# Patient Record
Sex: FEMALE | Race: OTHER | Employment: UNEMPLOYED | URBAN - METROPOLITAN AREA
[De-identification: names, ages, dates, MRNs, and addresses within clinical notes are randomized per-mention and may not be internally consistent; named-entity substitution may affect disease eponyms.]

---

## 2018-07-13 ENCOUNTER — OFFICE VISIT (OUTPATIENT)
Dept: NEUROLOGY | Age: 68
End: 2018-07-13

## 2018-07-13 VITALS
RESPIRATION RATE: 18 BRPM | BODY MASS INDEX: 26.04 KG/M2 | SYSTOLIC BLOOD PRESSURE: 128 MMHG | HEIGHT: 62 IN | WEIGHT: 141.5 LBS | DIASTOLIC BLOOD PRESSURE: 74 MMHG | OXYGEN SATURATION: 92 % | HEART RATE: 67 BPM

## 2018-07-13 DIAGNOSIS — G25.0 ESSENTIAL TREMOR: Primary | ICD-10-CM

## 2018-07-13 RX ORDER — PROPRANOLOL HYDROCHLORIDE 60 MG/1
60 CAPSULE, EXTENDED RELEASE ORAL DAILY
Qty: 30 CAP | Refills: 6 | Status: SHIPPED | OUTPATIENT
Start: 2018-07-13 | End: 2019-04-14

## 2018-07-13 NOTE — PROGRESS NOTES
Essential tremor. Neurology Consult      Subjective: Bernice Oden is a 74 y.o. female who comes in today alone. Says she currently lives with her daughter. She says she has had tremors for the last year involving the whole body. Family history negative for the same as she knows it. Knows of nothing that makes it worse or better. Says she notices the tremor is most problematic when she is writing or using her hands in a manner of finesse. There is been no falls. Says her cognition is been good and transfers okay. No psychosis in the picture is I know it. Unfortunately we do not have a validated medication list that needs to be produced so I can proceed with the treatment of what appears to be an essential tremor. Current Outpatient Prescriptions   Medication Sig Dispense Refill    cholecalciferol, vitamin D3, (VITAMIN D3 PO) Take  by mouth.  OTHER \"blue memory pill\"      divalproex DR (DEPAKOTE) 500 mg tablet Take 500 mg by mouth two (2) times a day.  clonazePAM (KLONOPIN) 0.5 mg tablet Take 0.5 mg by mouth two (2) times daily as needed.  propranolol (INDERAL) 10 mg tablet Take 10 mg by mouth two (2) times a day. No Known Allergies  Past Medical History:   Diagnosis Date    Bipolar affective ([facility] [address]     Psychiatric disorder     bipolar      Past Surgical History:   Procedure Laterality Date    HX CHOLECYSTECTOMY  1    HX TONSILLECTOMY        Social History     Social History    Marital status:      Spouse name: N/A    Number of children: N/A    Years of education: N/A     Occupational History    Not on file. Social History Main Topics    Smoking status: Current Every Day Smoker     Packs/day: 1.00     Years: 50.00    Smokeless tobacco: Never Used    Alcohol use No    Drug use: No    Sexual activity: Not on file     Other Topics Concern    Not on file     Social History Narrative      No family history on file.    Visit Vitals    /74    Pulse 67    Resp 18    Ht 5' 2\" (1.575 m)    Wt 64.2 kg (141 lb 8 oz)    SpO2 92%    BMI 25.88 kg/m2        Review of Systems:   A comprehensive review of systems was negative except for that written in the HPI. Neuro Exam:     Appearance: The patient is well developed, well nourished, provides a partial coherent history and is in no acute distress. Mental Status: Oriented to time, place and person. Mood and affect appropriate. Cranial Nerves:   Intact visual fields. Fundi are benign. CUAUHTEMOC, EOM's full, no nystagmus, no ptosis. Facial sensation is normal. Corneal reflexes are intact. Facial movement is symmetric. Hearing is normal bilaterally. Palate is midline with normal sternocleidomastoid and trapezius muscles are normal. Tongue is midline. Motor:  5/5 strength in upper and lower proximal and distal muscles. Normal bulk and tone. No fasciculations. Reflexes:   Deep tendon reflexes 1-2+/4 and symmetrical.   Sensory:   Normal to touch, pinprick and vibration. Gait:  Normal gait but somewhat cautious and deliberate. Tremor:    tremor noted of the head and the upper extremities with a postural kinetic and intention quality. No confident rigidity or bradykinesia and transfers were taken reasonably. .   Cerebellar:  No cerebellar signs present. Neurovascular:  Normal heart sounds and regular rhythm, peripheral pulses intact, and no carotid bruits. Archimedes spiral shows superimposed variations on her concentric circles and deviations in her cursive writing. Assessment:   Essential tremor. I need the patient's actual medication list to make sense of which drug I will challenge her tremor with. Would like to initially consider a beta-blocker like inderal but good default to mysoline Topamax or gabapentin. Patient urged to stop smoking. Patient warned that I have medicine that can help control the tremor, but nothing is going to cure it or anything similar.         Plan:   Revisit 2 months.   Signed by :  Rachel Masterson MD

## 2018-07-13 NOTE — PROGRESS NOTES
Patient's medication list finally surfaced. Looks like she is on Depakote which could enhance an essential type tremor for what it is worth. Will prescribe inderal LA 60 mg a day as an essential tremor suppressant. tamika

## 2018-07-13 NOTE — MR AVS SNAPSHOT
[address] [address] [place] [facility] [address] [phone] Patient: Geovanni Peterson MRN: [id number] Visit Information Date & Time Provider Department Dept. Phone Encounter #  
 7/13/2018  9:00 AM Henny Pat MD [facility] Neurology [facility] [phone] [id number] Follow-up Instructions Return in about 2 months (around 9/13/2018). Follow-up and Disposition History Upcoming Health Maintenance Date Due Hepatitis C Screening 1950 DTaP/Tdap/Td series (1 - Tdap) 8/27/1973 BREAST CANCER SCRN MAMMOGRAM 8/27/2002 FOBT Q 1 YEAR AGE 50-75 8/27/2002 ZOSTER VACCINE AGE 60> 6/27/2012 GLAUCOMA SCREENING Q2Y 8/27/2017 Bone Densitometry (Dexa) Screening 8/27/2017 Pneumococcal 65+ Low/Medium Risk (1 of 2 - PCV13) 8/27/2017 Influenza Age 5 to Adult 8/1/2018 Allergies as of 7/13/2018  Review Complete On: 7/13/2018 By: Henny Pat MD  
 No Known Allergies Current Immunizations  Never Reviewed No immunizations on file. Not reviewed this visit You Were Diagnosed With   
  
 Codes Comments Essential tremor    -  Primary ICD-10-CM: G25.0 ICD-9-CM: 333.1 Vitals BP Pulse Resp Height(growth percentile) Weight(growth percentile) SpO2  
 128/74 67 18 5' 2\" (1.575 m) 141 lb 8 oz (64.2 kg) 92% BMI OB Status Smoking Status 25.88 kg/m2 Postmenopausal Current Every Day Smoker Vitals History BMI and BSA Data Body Mass Index Body Surface Area  
 25.88 kg/m 2 1.68 m 2 Your Updated Medication List  
  
   
This list is accurate as of 7/13/18 10:07 AM.  Always use your most recent med list.  
  
  
  
  
 clonazePAM 0.5 mg tablet Commonly known as:  Britton Angeles Take 0.5 mg by mouth two (2) times daily as needed. divalproex  mg tablet Commonly known as:  DEPAKOTE  
 Take 500 mg by mouth two (2) times a day. OTHER \"blue memory pill\" * propranolol 10 mg tablet Commonly known as:  INDERAL Take 10 mg by mouth two (2) times a day. * propranolol LA 60 mg SR capsule Commonly known as:  INDERAL LA Take 1 Cap by mouth daily. Indications: ESSENTIAL TREMOR  
  
 VITAMIN D3 PO Take  by mouth. * Notice: This list has 2 medication(s) that are the same as other medications prescribed for you. Read the directions carefully, and ask your doctor or other care provider to review them with you. Prescriptions Printed Refills  
 propranolol LA (INDERAL LA) 60 mg SR capsule 6 Sig: Take 1 Cap by mouth daily. Indications: ESSENTIAL TREMOR Class: Print Route: Oral  
  
Follow-up Instructions Return in about 2 months (around 9/13/2018). Patient Instructions Information Regarding Testing and Medication If you have physican order for a test or a medication denied by your insurance company, this does not mean the test or medication is not appropriate for you as that is a medical decision, not a decision to be made by an insurance company representative or by an [facility] physician who has not interviewed and examined you. This is a decision to be made between you and your physician. The denial of services is a contractual matter between you and your insurance company, not an issue between your physician and the insurance company. If your test or medication is denied, you can take the following steps to help resolve the issue: 1. File a complaint with the [facility] of Insurance regarding your insurance company's denial of services ordered for you. You can do this either by calling them directly or by completing an on-line complaint form on the [employer]. This can be found at www.[place].gov 2.    Also file a formal complaint with your insurance company and ask to have the name of the person denying the service so that you may explore a legal option should you be harmed by this denial of service. Again, the fact the insurance company will not pay for the service does not mean it is not medically necessary and I would encourage you to follow through with the plan that was made with your physician 3. File a written complaint with your employer so your employer and benefit manager is aware of the poor coverage they are providing their employees. If you have medicare/medicaid, complain to your representative in the House and to your Amilcar Huerta. If we have ordered testing for you, we do not call patients with results and we do not give test results over the phone. We schedule follow up appointments so that your results can be discussed in person and any questions you have regarding them may be addressed. If something of concern is revealed on your test, we will call you for a sooner follow up appointment. Additionally, results may be found by using the My Chart feature and one of our patient service representatives at the  can give you instructions on how to access this feature of our electronic medical record system. [address] What is a living will? A living will is a legal form you use to write down the kind of care you want at the end of your life. It is used by the health professionals who will treat you if you aren't able to decide for yourself. If you put your wishes in writing, your loved ones and others will know what kind of care you want. They won't need to guess. This can ease your mind and be helpful to others. A living will is not the same as an estate or property will. An estate will explains what you want to happen with your money and property after you die. Is a living will a legal document? A living will is a legal document.  Each state has its own laws about living parra. If you move to another state, make sure that your living will is legal in the state where you now live. Or you might use a universal form that has been approved by many states. This kind of form can sometimes be completed and stored online. Your electronic copy will then be available wherever you have a connection to the Internet. In most cases, doctors will respect your wishes even if you have a form from a different state. · You don't need an  to complete a living will. But legal advice can be helpful if your state's laws are unclear, your health history is complicated, or your family can't agree on what should be in your living will. · You can change your living will at any time. Some people find that their wishes about end-of-life care change as their health changes. · In addition to making a living will, think about completing a medical power of  form. This form lets you name the person you want to make end-of-life treatment decisions for you (your \"health care agent\") if you're not able to. Many hospitals and nursing homes will give you the forms you need to complete a living will and a medical power of . · Your living will is used only if you can't make or communicate decisions for yourself anymore. If you become able to make decisions again, you can accept or refuse any treatment, no matter what you wrote in your living will. · Your state may offer an online registry. This is a place where you can store your living will online so the doctors and nurses who need to treat you can find it right away. What should you think about when creating a living will? Talk about your end-of-life wishes with your family members and your doctor. Let them know what you want. That way the people making decisions for you won't be surprised by your choices. Think about these questions as you make your living will: · Do you know enough about life support methods that might be used? If not, talk to your doctor so you know what might be done if you can't breathe on your own, your heart stops, or you're unable to swallow. · What things would you still want to be able to do after you receive life-support methods? Would you want to be able to walk? To speak? To eat on your own? To live without the help of machines? · If you have a choice, where do you want to be cared for? In your home? At a hospital or nursing home? · Do you want certain Holiness practices performed if you become very ill? · If you have a choice at the end of your life, where would you prefer to die? At home? In a hospital or nursing home? Somewhere else? · Would you prefer to be buried or cremated? · Do you want your organs to be donated after you die? What should you do with your living will? · Make sure that your family members and your health care agent have copies of your living will. · Give your doctor a copy of your living will to keep in your medical record. If you have more than one doctor, make sure that each one has a copy. · You may want to put a copy of your living will where it can be easily found. Where can you learn more? Go to http://fransisco-baudilio.info/. Enter K855 in the search box to learn more about \"Learning About Living Frank Pablo. \" Current as of: October 6, 2017 Content Version: 11.7 © 0838-8127 Healthwise, Incorporated. Care instructions adapted under license by [employer] (which disclaims liability or warranty for this information). If you have questions about a medical condition or this instruction, always ask your healthcare professional. [address] [address] any warranty or liability for your use of this information. Patient history reviewed and patient examined. Appears to have attributes of essential tremor.   I need the patient's medication list to make an accurate and appropriate assessment of a drug challenge to make this happen. Will suggest revisit in about 2 months. The quicker I get the medication list, the quicker I can put this medicine into play. Patient Instructions History Introducing [facility]! Sarath Benitez introduces Strat[employer]ale patient portal. Now you can access parts of your medical record, email your doctor's office, and request medication refills online. 1. In your internet browser, go to [email or web address] 2. Click on the First Time User? Click Here link in the Sign In box. You will see the New Member Sign Up page. 3. Enter your Stratoscale Access Code exactly as it appears below. You will not need to use this code after youve completed the sign-up process. If you do not sign up before the expiration date, you must request a new code. · Stratoscale Access Code: [email or web address] Expires: 10/11/2018  8:24 AM 
 
4. Enter the last four digits of your Social Security Number (xxxx) and Date of Birth (mm/dd/yyyy) as indicated and click Submit. You will be taken to the next sign-up page. 5. Create a Stratoscale ID. This will be your Stratoscale login ID and cannot be changed, so think of one that is secure and easy to remember. 6. Create a Stratoscale password. You can change your password at any time. 7. Enter your Password Reset Question and Answer. This can be used at a later time if you forget your password. 8. Enter your e-mail address. You will receive e-mail notification when new information is available in [address] 9. Click Sign Up. You can now view and download portions of your medical record. 10. Click the Download Summary menu link to download a portable copy of your medical information. If you have questions, please visit the Frequently Asked Questions section of the Stratoscale website. Remember, Stratoscale is NOT to be used for urgent needs. For medical emergencies, dial 911. Now available from your iPhone and Android! Please provide this summary of care documentation to your next provider. Your primary care clinician is listed as Faisal Driscoll. If you have any questions after today's visit, please call [phone].

## 2018-07-13 NOTE — PATIENT INSTRUCTIONS
Information Regarding Testing and Medication    If you have physican order for a test or a medication denied by your insurance company, this does not mean the test or medication is not appropriate for you as that is a medical decision, not a decision to be made by an insurance company representative or by an [facility] physician who has not interviewed and examined you. This is a decision to be made between you and your physician. The denial of services is a contractual matter between you and your insurance company, not an issue between your physician and the insurance company. If your test or medication is denied, you can take the following steps to help resolve the issue:    1. File a complaint with the [facility] regarding your insurance company's denial of services ordered for you. You can do this either by calling them directly or by completing an on-line complaint form on the [employer]. This can be found at www.[place].GaN Systems    2. Also file a formal complaint with your insurance company and ask to have the name of the person denying the service so that you may explore a legal option should you be harmed by this denial of service. Again, the fact the insurance company will not pay for the service does not mean it is not medically necessary and I would encourage you to follow through with the plan that was made with your physician    3. File a written complaint with your employer so your employer and benefit manager is aware of the poor coverage they are providing their employees. If you have medicare/medicaid, complain to your representative in the House and to your Aimlcar Huerta. If we have ordered testing for you, we do not call patients with results and we do not give test results over the phone. We schedule follow up appointments so that your results can be discussed in person and any questions you have regarding them may be addressed. If something of concern is revealed on your test, we will call you for a sooner follow up appointment. Additionally, results may be found by using the My Chart feature and one of our patient service representatives at the  can give you instructions on how to access this feature of our electronic medical record system. Learning About Living [place]  What is a living will? A living will is a legal form you use to write down the kind of care you want at the end of your life. It is used by the health professionals who will treat you if you aren't able to decide for yourself. If you put your wishes in writing, your loved ones and others will know what kind of care you want. They won't need to guess. This can ease your mind and be helpful to others. A living will is not the same as an estate or property will. An estate will explains what you want to happen with your money and property after you die. Is a living will a legal document? A living will is a legal document. Each state has its own laws about living parra. If you move to another state, make sure that your living will is legal in the state where you now live. Or you might use a universal form that has been approved by many states. This kind of form can sometimes be completed and stored online. Your electronic copy will then be available wherever you have a connection to the Internet. In most cases, doctors will respect your wishes even if you have a form from a different state. · You don't need an  to complete a living will. But legal advice can be helpful if your state's laws are unclear, your health history is complicated, or your family can't agree on what should be in your living will. · You can change your living will at any time. Some people find that their wishes about end-of-life care change as their health changes. · In addition to making a living will, think about completing a medical power of  form. This form lets you name the person you want to make end-of-life treatment decisions for you (your \"health care agent\") if you're not able to. Many hospitals and nursing homes will give you the forms you need to complete a living will and a medical power of . · Your living will is used only if you can't make or communicate decisions for yourself anymore. If you become able to make decisions again, you can accept or refuse any treatment, no matter what you wrote in your living will. · Your state may offer an online registry. This is a place where you can store your living will online so the doctors and nurses who need to treat you can find it right away. What should you think about when creating a living will? Talk about your end-of-life wishes with your family members and your doctor. Let them know what you want. That way the people making decisions for you won't be surprised by your choices. Think about these questions as you make your living will:  · Do you know enough about life support methods that might be used? If not, talk to your doctor so you know what might be done if you can't breathe on your own, your heart stops, or you're unable to swallow. · What things would you still want to be able to do after you receive life-support methods? Would you want to be able to walk? To speak? To eat on your own? To live without the help of machines? · If you have a choice, where do you want to be cared for? In your home? At a hospital or nursing home? · Do you want certain Alevism practices performed if you become very ill? · If you have a choice at the end of your life, where would you prefer to die? At home? In a hospital or nursing home? Somewhere else? · Would you prefer to be buried or cremated? · Do you want your organs to be donated after you die? What should you do with your living will? · Make sure that your family members and your health care agent have copies of your living will.   · Give your doctor a copy of your living will to keep in your medical record. If you have more than one doctor, make sure that each one has a copy. · You may want to put a copy of your living will where it can be easily found. Where can you learn more? Go to http://fransisco-baudilio.info/. Enter J209 in the search box to learn more about \"Learning About Living Catia. \"  Current as of: October 6, 2017  Content Version: 11.7  © 7018-0608 [employer]. Care instructions adapted under license by [employer] (which disclaims liability or warranty for this information). If you have questions about a medical condition or this instruction, always ask your healthcare professional. [address] any warranty or liability for your use of this information. Patient history reviewed and patient examined. Appears to have attributes of essential tremor. I need the patient's medication list to make an accurate and appropriate assessment of a drug challenge to make this happen. Will suggest revisit in about 2 months. The quicker I get the medication list, the quicker I can put this medicine into play.

## 2019-04-14 ENCOUNTER — APPOINTMENT (OUTPATIENT)
Dept: CT IMAGING | Age: 69
End: 2019-04-14
Attending: EMERGENCY MEDICINE
Payer: MEDICAID

## 2019-04-14 ENCOUNTER — HOSPITAL ENCOUNTER (EMERGENCY)
Age: 69
Discharge: HOME | End: 2019-04-15
Attending: EMERGENCY MEDICINE | Admitting: EMERGENCY MEDICINE
Payer: MEDICAID

## 2019-04-14 DIAGNOSIS — R19.7 DIARRHEA, UNSPECIFIED TYPE: ICD-10-CM

## 2019-04-14 DIAGNOSIS — K52.9 COLITIS: Primary | ICD-10-CM

## 2019-04-14 LAB
ALBUMIN SERPL-MCNC: 3.1 G/DL (ref 3.5–5)
ALBUMIN/GLOB SERPL: 0.8 {RATIO} (ref 1.1–2.2)
ALP SERPL-CCNC: 48 U/L (ref 45–117)
ALT SERPL-CCNC: 13 U/L (ref 12–78)
ANION GAP SERPL CALC-SCNC: 6 MMOL/L (ref 5–15)
APPEARANCE UR: ABNORMAL
AST SERPL-CCNC: 16 U/L (ref 15–37)
BACTERIA URNS QL MICRO: ABNORMAL /HPF
BASOPHILS # BLD: 0 K/UL (ref 0–0.1)
BASOPHILS NFR BLD: 0 % (ref 0–1)
BILIRUB SERPL-MCNC: 0.3 MG/DL (ref 0.2–1)
BILIRUB UR QL CFM: NEGATIVE
BUN SERPL-MCNC: 12 MG/DL (ref 6–20)
BUN/CREAT SERPL: 15 (ref 12–20)
CALCIUM SERPL-MCNC: 8.5 MG/DL (ref 8.5–10.1)
CHLORIDE SERPL-SCNC: 103 MMOL/L (ref 97–108)
CO2 SERPL-SCNC: 29 MMOL/L (ref 21–32)
COLOR UR: ABNORMAL
CREAT SERPL-MCNC: 0.82 MG/DL (ref 0.55–1.02)
DIFFERENTIAL METHOD BLD: ABNORMAL
EOSINOPHIL # BLD: 0 K/UL (ref 0–0.4)
EOSINOPHIL NFR BLD: 1 % (ref 0–7)
EPITH CASTS URNS QL MICRO: ABNORMAL /LPF
ERYTHROCYTE [DISTWIDTH] IN BLOOD BY AUTOMATED COUNT: 13.2 % (ref 11.5–14.5)
GLOBULIN SER CALC-MCNC: 4.1 G/DL (ref 2–4)
GLUCOSE SERPL-MCNC: 83 MG/DL (ref 65–100)
GLUCOSE UR STRIP.AUTO-MCNC: NEGATIVE MG/DL
HCT VFR BLD AUTO: 41.1 % (ref 35–47)
HGB BLD-MCNC: 13.9 G/DL (ref 11.5–16)
HGB UR QL STRIP: NEGATIVE
IMM GRANULOCYTES # BLD AUTO: 0 K/UL (ref 0–0.04)
IMM GRANULOCYTES NFR BLD AUTO: 0 % (ref 0–0.5)
KETONES UR QL STRIP.AUTO: ABNORMAL MG/DL
LEUKOCYTE ESTERASE UR QL STRIP.AUTO: ABNORMAL
LYMPHOCYTES # BLD: 2.3 K/UL (ref 0.8–3.5)
LYMPHOCYTES NFR BLD: 28 % (ref 12–49)
MAGNESIUM SERPL-MCNC: 2 MG/DL (ref 1.6–2.4)
MCH RBC QN AUTO: 34.1 PG (ref 26–34)
MCHC RBC AUTO-ENTMCNC: 33.8 G/DL (ref 30–36.5)
MCV RBC AUTO: 100.7 FL (ref 80–99)
MONOCYTES # BLD: 0.7 K/UL (ref 0–1)
MONOCYTES NFR BLD: 8 % (ref 5–13)
NEUTS SEG # BLD: 5.1 K/UL (ref 1.8–8)
NEUTS SEG NFR BLD: 63 % (ref 32–75)
NITRITE UR QL STRIP.AUTO: NEGATIVE
NRBC # BLD: 0 K/UL (ref 0–0.01)
NRBC BLD-RTO: 0 PER 100 WBC
PH UR STRIP: 5.5 [PH] (ref 5–8)
PLATELET # BLD AUTO: 207 K/UL (ref 150–400)
PMV BLD AUTO: 11.7 FL (ref 8.9–12.9)
POTASSIUM SERPL-SCNC: 3.6 MMOL/L (ref 3.5–5.1)
PROT SERPL-MCNC: 7.2 G/DL (ref 6.4–8.2)
PROT UR STRIP-MCNC: NEGATIVE MG/DL
RBC # BLD AUTO: 4.08 M/UL (ref 3.8–5.2)
RBC #/AREA URNS HPF: ABNORMAL /HPF (ref 0–5)
SODIUM SERPL-SCNC: 138 MMOL/L (ref 136–145)
SP GR UR REFRACTOMETRY: 1.02 (ref 1–1.03)
UA: UC IF INDICATED,UAUC: ABNORMAL
UROBILINOGEN UR QL STRIP.AUTO: 0.2 EU/DL (ref 0.2–1)
WBC # BLD AUTO: 8.1 K/UL (ref 3.6–11)
WBC URNS QL MICRO: ABNORMAL /HPF (ref 0–4)

## 2019-04-14 PROCEDURE — 81001 URINALYSIS AUTO W/SCOPE: CPT

## 2019-04-14 PROCEDURE — 74011250636 HC RX REV CODE- 250/636: Performed by: EMERGENCY MEDICINE

## 2019-04-14 PROCEDURE — 83735 ASSAY OF MAGNESIUM: CPT

## 2019-04-14 PROCEDURE — 74011636320 HC RX REV CODE- 636/320: Performed by: EMERGENCY MEDICINE

## 2019-04-14 PROCEDURE — 80053 COMPREHEN METABOLIC PANEL: CPT

## 2019-04-14 PROCEDURE — 36415 COLL VENOUS BLD VENIPUNCTURE: CPT

## 2019-04-14 PROCEDURE — 85025 COMPLETE CBC W/AUTO DIFF WBC: CPT

## 2019-04-14 PROCEDURE — 96360 HYDRATION IV INFUSION INIT: CPT

## 2019-04-14 PROCEDURE — 74177 CT ABD & PELVIS W/CONTRAST: CPT

## 2019-04-14 PROCEDURE — 87086 URINE CULTURE/COLONY COUNT: CPT

## 2019-04-14 PROCEDURE — 96361 HYDRATE IV INFUSION ADD-ON: CPT

## 2019-04-14 PROCEDURE — 99284 EMERGENCY DEPT VISIT MOD MDM: CPT

## 2019-04-14 RX ORDER — LEVOTHYROXINE SODIUM 88 UG/1
88 TABLET ORAL
COMMUNITY

## 2019-04-14 RX ORDER — SODIUM CHLORIDE 0.9 % (FLUSH) 0.9 %
10 SYRINGE (ML) INJECTION
Status: COMPLETED | OUTPATIENT
Start: 2019-04-14 | End: 2019-04-14

## 2019-04-14 RX ORDER — CIPROFLOXACIN 500 MG/1
500 TABLET ORAL 2 TIMES DAILY
Qty: 14 TAB | Refills: 0 | Status: SHIPPED | OUTPATIENT
Start: 2019-04-14 | End: 2019-04-21

## 2019-04-14 RX ORDER — METRONIDAZOLE 500 MG/1
500 TABLET ORAL 2 TIMES DAILY
Qty: 14 TAB | Refills: 0 | Status: SHIPPED | OUTPATIENT
Start: 2019-04-14

## 2019-04-14 RX ORDER — DONEPEZIL HYDROCHLORIDE 5 MG/1
5 TABLET, FILM COATED ORAL
COMMUNITY

## 2019-04-14 RX ADMIN — IOPAMIDOL 100 ML: 755 INJECTION, SOLUTION INTRAVENOUS at 22:46

## 2019-04-14 RX ADMIN — SODIUM CHLORIDE 1000 ML: 900 INJECTION, SOLUTION INTRAVENOUS at 22:12

## 2019-04-14 RX ADMIN — Medication 10 ML: at 22:46

## 2019-04-15 VITALS
HEIGHT: 63 IN | WEIGHT: 150.35 LBS | DIASTOLIC BLOOD PRESSURE: 74 MMHG | HEART RATE: 79 BPM | SYSTOLIC BLOOD PRESSURE: 118 MMHG | RESPIRATION RATE: 18 BRPM | BODY MASS INDEX: 26.64 KG/M2 | TEMPERATURE: 98.5 F | OXYGEN SATURATION: 93 %

## 2019-04-15 NOTE — ED NOTES
Patient discharged by Irasema Graham MD. Patient provided with discharge instructions Rx and instructions on follow up care. Patient out of ED via wheelchair accompanied by son.

## 2019-04-15 NOTE — DISCHARGE INSTRUCTIONS
Patient Education     Colitis: Care Instructions  Your Care Instructions  Colitis is the medical term for swelling (inflammation) of the intestine. It can be caused by different things, such as an infection or loss of blood flow in the intestine. Other causes are problems like Crohn's disease or ulcerative colitis. Symptoms may include fever, diarrhea that may be bloody, or belly pain. Sometimes symptoms go away without treatment. But you may need treatment or more tests, such as blood tests or a stool test. Or you may need imaging tests like a CT scan or a colonoscopy. In some cases, the doctor may want to test a sample of tissue from the intestine. This test is called a biopsy. The doctor has checked you carefully, but problems can develop later. If you notice any problems or new symptoms, get medical treatment right away. Follow-up care is a key part of your treatment and safety. Be sure to make and go to all appointments, and call your doctor if you are having problems. It's also a good idea to know your test results and keep a list of the medicines you take. How can you care for yourself at home? · Rest until you feel better. · Your doctor may recommend that you eat bland foods. These include rice, dry toast or crackers, bananas, and applesauce. · To prevent dehydration, drink plenty of fluids. Choose water and other caffeine-free clear liquids until you feel better. If you have kidney, heart, or liver disease and have to limit fluids, talk with your doctor before you increase the amount of fluids you drink. · Be safe with medicines. Take your medicines exactly as prescribed. Call your doctor if you think you are having a problem with your medicine. You will get more details on the specific medicines your doctor prescribes. When should you call for help? Call 911 anytime you think you may need emergency care. For example, call if:  · You passed out (lost consciousness).   · You vomit blood or what looks like coffee grounds. · Your stools are maroon or very bloody. Call your doctor now or seek immediate medical care if:  · You have new or worse pain. · You have a new or higher fever. · You have new or worse symptoms. · You cannot keep fluids or medicines down. Watch closely for changes in your health, and be sure to contact your doctor if:  · You do not get better as expected. Where can you learn more? Go to [employer]So[employer]be  Enter [id number] in the search box to learn more about \"Colitis: Care Instructions. \"   © 2297-8529 [employer], [employer]. Care instructions adapted under license by [facility] [employer] (which disclaims liability or warranty for this information). This care instruction is for use with your licensed healthcare professional. If you have questions about a medical condition or this instruction, always ask your healthcare professional. [address] any warranty or liability for your use of this information. Content Version: 98.9.885561; Current as of: November 20, 2015           Patient Education        Diarrhea: Care Instructions  Your Care Instructions    Diarrhea is loose, watery stools (bowel movements). The exact cause is often hard to find. Sometimes diarrhea is your body's way of getting rid of what caused an upset stomach. Viruses, food poisoning, and many medicines can cause diarrhea. Some people get diarrhea in response to emotional stress, anxiety, or certain foods. Almost everyone has diarrhea now and then. It usually isn't serious, and your stools will return to normal soon. The important thing to do is replace the fluids you have lost, so you can prevent dehydration. The doctor has checked you carefully, but problems can develop later. If you notice any problems or new symptoms, get medical treatment right away. Follow-up care is a key part of your treatment and safety.  Be sure to make and go to all appointments, and call your doctor if you are having problems. It's also a good idea to know your test results and keep a list of the medicines you take. How can you care for yourself at home? · Watch for signs of dehydration, which means your body has lost too much water. Dehydration is a serious condition and should be treated right away. Signs of dehydration are:  ? Increasing thirst and dry eyes and mouth. ? Feeling faint or lightheaded. ? Darker urine, and a smaller amount of urine than normal.  · To prevent dehydration, drink plenty of fluids, enough so that your urine is light yellow or clear like water. Choose water and other caffeine-free clear liquids until you feel better. If you have kidney, heart, or liver disease and have to limit fluids, talk with your doctor before you increase the amount of fluids you drink. · Begin eating small amounts of mild foods the next day, if you feel like it. ? Try yogurt that has live cultures of Lactobacillus. (Check the label.)  ? Avoid spicy foods, fruits, alcohol, and caffeine until 48 hours after all symptoms are gone. ? Avoid chewing gum that contains sorbitol. ? Avoid dairy products (except for yogurt with Lactobacillus) while you have diarrhea and for 3 days after symptoms are gone. · The doctor may recommend that you take over-the-counter medicine, such as loperamide (Imodium), if you still have diarrhea after 6 hours. Read and follow all instructions on the label. Do not use this medicine if you have bloody diarrhea, a high fever, or other signs of serious illness. Call your doctor if you think you are having a problem with your medicine. When should you call for help? Call 911 anytime you think you may need emergency care.  For example, call if:    · You passed out (lost consciousness).     · Your stools are maroon or very bloody.    Call your doctor now or seek immediate medical care if:    · You are dizzy or lightheaded, or you feel like you may faint.     · Your stools are black and look like tar, or they have streaks of blood.     · You have new or worse belly pain.     · You have symptoms of dehydration, such as:  ? Dry eyes and a dry mouth. ? Passing only a little dark urine. ? Feeling thirstier than usual.     · You have a new or higher fever.    Watch closely for changes in your health, and be sure to contact your doctor if:    · Your diarrhea is getting worse.     · You see pus in the diarrhea.     · You are not getting better after 2 days (48 hours). Where can you learn more? Go to http://fransisco-baudilio.info/. Enter U180 in the search box to learn more about \"Diarrhea: Care Instructions. \"  Current as of: September 23, 2018  Content Version: 11.9  © 4111-9633 [employer]. Care instructions adapted under license by [employer] (which disclaims liability or warranty for this information). If you have questions about a medical condition or this instruction, always ask your healthcare professional. [place] [address] any warranty or liability for your use of this information.

## 2019-04-15 NOTE — ED TRIAGE NOTES
Pt arrived ambulatory with diarrhea x 10 days. Denies vomiting. Pt reports having black stools. Pt states seeing some blood.

## 2019-04-15 NOTE — ED PROVIDER NOTES
EMERGENCY DEPARTMENT HISTORY AND PHYSICAL EXAM 
 
 
Date: 4/14/2019 Patient Name: Khadijah Cid History of Presenting Illness Chief Complaint Patient presents with  Diarrhea  
  times 10 days History Provided By: Patient HPI: Khadijah Cid, 79 y.o. female with past medical history as noted below presents to the emergency department with chief complaint of diarrhea and fatigue. Patient states that for approximately 10 days now she has had multiple episodes of loose watery stools throughout the course of the day. Patient notes she is becoming increasingly fatigued from the diarrhea. Patient also notes mild intermittent left lower abdominal cramping that is without specific relieving or exacerbating factors. Patient describes the diarrhea is nonbloody and otherwise denies any associated nausea, vomiting, fevers, chills, chest pain, shortness of breath, syncope. Patient denies any recent travel, recent hospitalization or recent antibiotic use. PCP: Ramiro Valencia NP Current Outpatient Medications Medication Sig Dispense Refill  levothyroxine (SYNTHROID) 88 mcg tablet Take 88 mcg by mouth Daily (before breakfast).  donepezil (ARICEPT) 5 mg tablet Take 5 mg by mouth nightly.  metroNIDAZOLE (FLAGYL) 500 mg tablet Take 1 Tab by mouth two (2) times a day. 14 Tab 0  
 ciprofloxacin HCl (CIPRO) 500 mg tablet Take 1 Tab by mouth two (2) times a day for 7 days. 14 Tab 0  cholecalciferol, vitamin D3, (VITAMIN D3 PO) Take  by mouth.  divalproex DR (DEPAKOTE) 500 mg tablet Take 500 mg by mouth two (2) times a day.  clonazePAM (KLONOPIN) 0.5 mg tablet Take 0.5 mg by mouth two (2) times daily as needed.  OTHER \"blue memory pill\" Past History Past Medical History: 
Past Medical History:  
Diagnosis Date  Bipolar affective ([facility] [address]  Psychiatric disorder   
 bipolar Past Surgical History: 
Past Surgical History:  
Procedure Laterality Date  HX CHOLECYSTECTOMY  2015  HX TONSILLECTOMY Family History: 
History reviewed. No pertinent family history. Social History: 
Social History Tobacco Use  Smoking status: Current Every Day Smoker Packs/day: 1.00 Years: 50.00 Pack years: 50.00  Smokeless tobacco: Never Used Substance Use Topics  Alcohol use: No  
 Drug use: No  
 
 
Allergies: 
No Known Allergies Review of Systems Review of Systems Constitutional: Negative for fever, chills, and fatigue. HENT: Negative for congestion, sore throat, rhinorrhea, sneezing and neck stiffness Eyes: Negative for discharge and redness. Respiratory: Negative for  shortness of breath, wheezing Cardiovascular: Negative for chest pain, palpitations Gastrointestinal: Positive for abdominal pain and diarrhea. Negative for nausea, vomiting constipation and blood in stool. Genitourinary: Negative for dysuria, hematuria, flank pain, decreased urine volume, discharge, Musculoskeletal: Negative for myalgias or joint pain . Skin: Negative for rash or lesions . Neurological: Negative weakness, light-headedness, numbness and headaches. Physical Exam  
Physical Exam 
 
GENERAL: alert and oriented, no acute distress EYES: PEERL, No injection, discharge or icterus. ENT: Mucous membranes pink and moist. 
NECK: Supple LUNGS: Airway patent. Non-labored respirations. Breath sounds clear with good air entry bilaterally. HEART: Regular rate and rhythm. No peripheral edema ABDOMEN: Non-distended, mild left lower quadrant tenderness, without guarding or rebound. SKIN:  warm, dry MSK/EXTREMITIES: Without swelling, tenderness or deformity, symmetric with normal ROM 
NEUROLOGICAL: Alert, oriented Diagnostic Study Results Labs - No results found for this or any previous visit (from the past 12 hour(s)). Radiologic Studies -  
CT ABD PELV W CONT Final Result IMPRESSION:   
 Findings of colitis likely attributable to nonspecific infection or  
inflammation. There is no bowel obstruction. CT Results  (Last 48 hours) 04/14/19 2247  CT ABD PELV W CONT Final result Impression:  IMPRESSION:   
Findings of colitis likely attributable to nonspecific infection or  
inflammation. There is no bowel obstruction. Narrative:  EXAM:  CT ABD PELV W CONT INDICATION: Left lower quadrant pain. Diarrhea. COMPARISON: CT 11/22/2013. TECHNIQUE: Helical CT of the abdomen  and pelvis  following the uneventful  
intravenous administration of nonionic contrast.  Coronal and sagittal reformats  
are performed. CT dose reduction was achieved through use of a standardized  
protocol tailored for this examination and automatic exposure control for dose  
modulation. FINDINGS:   
The visualized lung bases demonstrate no mass or consolidation. The heart size  
is normal. There is no pericardial or pleural effusion. The liver, spleen, pancreas, and adrenal glands are normal. The gall bladder is  
surgically absent without intra- or extra-hepatic biliary dilatation. The kidneys are symmetric without hydronephrosis. There are stable areas of  
right renal cortical thinning. There are circumferential wall thickening of the descending and sigmoid colon  
with mild pericolonic inflammation. The colon is diffusely fluid-filled. There  
are no dilated bowel loops. The appendix is unremarkable. There are no enlarged lymph nodes. There is no free fluid or free air. The  
aorta tapers without aneurysm. The urinary bladder is normal.  There is no pelvic mass. There are degenerative changes in the spine. There is no aggressive bony lesion. CXR Results  (Last 48 hours) None Medical Decision Making I am the first provider for this patient. I reviewed the vital signs, available nursing notes, past medical history, past surgical history, family history and social history. Vital Signs-Reviewed the patient's vital signs. Records Reviewed: Nursing Notes and Old Medical Records Provider Notes (Medical Decision Making): On presentation, the patient is well appearing, in no acute distress with normal vital signs. Patient presents to the emergency room for diarrhea. Differential diagnosis consideredincludes gastroenteritis, gastritis, enteritis, inflammatory bowel disease, bowelobstruction, pancreatitis, cholecystitis, appendicitis, c.diff colitis, travelers diarrhea,diverticulitis or food-borne illness. All labs and diagnostic studies as listed above were reviewed. Clinical history, examination, and work-up are consistent with colitis, infectious versus ischemic etiology considered. Abdominal examination did not demonstrate any acute surgical issues. Labs were reassuring and she felt much better after IV fluids. Patient denied recent travel to any endemic areas. Stools were reportedly non-bloody. The patient denied recent antibiotic use and overall concern for C.diff was lower but will treat for other potential causes of colitis. I did discuss admission with the patient and patient's son given the length of her symptoms and uncertainty as to the etiology however, they noted that she is feeling much better and would like to attempt antibiotics as an outpatient with close GI follow-up. I did instruct them to call to set up an appointment with gastroenterology first thing in the morning. They  Expressed understanding and in agreement with the plan Patient is encouraged to maintain hydration to replete any fluid losses, and should return to the ER for severe pain, intractable vomiting, fevers, bloody stools, or dehydration. The patientunderstood the diagnosis and treatment and had no further questions.  Patient should ramos primary care physician for follow-up, and was otherwise deemed stable for discharge to Home. ED Course:  
Initial assessment performed. The patients presenting problems have been discussed, and they are in agreement with the care plan formulated and outlined with them. I have encouraged them to ask questions as they arise throughout their visit. PROGRESS NOTE: The patient has been re-evaluated and is ready for discharge. Reviewed available results with patient and have counseled them on diagnosis and care plan. They have expressed understanding, and all their questions have been answered. They agree with plan and agree to have pt F/U as recommended, or return to the ED if their sxs worsen. Discharge instructions have been provided and explained to them, along with reasons to have pt return to the ED. The patient is amenable to discharge so will discharge pt at this time Carrie Valencia MD 
 
 
 
Disposition: 
home PLAN: 
1. Discharge Medication List as of 4/15/2019 12:05 AM  
  
START taking these medications Details  
metroNIDAZOLE (FLAGYL) 500 mg tablet Take 1 Tab by mouth two (2) times a day., Print, Disp-14 Tab, R-0  
  
ciprofloxacin HCl (CIPRO) 500 mg tablet Take 1 Tab by mouth two (2) times a day for 7 days. , Print, Disp-14 Tab, R-0  
  
  
CONTINUE these medications which have NOT CHANGED Details  
levothyroxine (SYNTHROID) 88 mcg tablet Take 88 mcg by mouth Daily (before breakfast). , Historical Med  
  
donepezil (ARICEPT) 5 mg tablet Take 5 mg by mouth nightly., Historical Med  
  
cholecalciferol, vitamin D3, (VITAMIN D3 PO) Take  by mouth., Historical Med  
  
divalproex DR (DEPAKOTE) 500 mg tablet Take 500 mg by mouth two (2) times a day., Historical Med  
  
clonazePAM (KLONOPIN) 0.5 mg tablet Take 0.5 mg by mouth two (2) times daily as needed., Historical Med OTHER \"blue memory pill\", Historical Med 2. Follow-up Information Follow up With Specialties Details Why Contact Info Nan Gilmore NP Nurse Practitioner Schedule an appointment as soon as possible for a visit in 2 days  [address] 
[phone] [facility]  Schedule an appointment as soon as possible for a visit in 1 day  [address]   [phone] Return to ED if worse Diagnosis Clinical Impression: 1. Colitis 2. Diarrhea, unspecified type

## 2019-04-15 NOTE — ED NOTES
While pt was ambulating to the bathroom with son, the pt turned the corner. The pt did not see the standing scale and pt tripped over the scale. Pt landed on her bottom. Pt immediately stood up with minimal assistance. Pt then ambulated into bathroom and back to room with RN minimal assistance required. Denies hitting head, denies LOC. Pt stating \"I'm fine, I'm ok\"

## 2019-04-16 LAB
BACTERIA SPEC CULT: NORMAL
CC UR VC: NORMAL
SERVICE CMNT-IMP: NORMAL

## 2019-04-28 ENCOUNTER — HOSPITAL ENCOUNTER (EMERGENCY)
Age: 69
Discharge: HOME | End: 2019-04-28
Attending: EMERGENCY MEDICINE
Payer: MEDICAID

## 2019-04-28 VITALS
SYSTOLIC BLOOD PRESSURE: 111 MMHG | OXYGEN SATURATION: 97 % | RESPIRATION RATE: 16 BRPM | TEMPERATURE: 98.5 F | HEIGHT: 64 IN | WEIGHT: 150.79 LBS | DIASTOLIC BLOOD PRESSURE: 79 MMHG | HEART RATE: 76 BPM | BODY MASS INDEX: 25.74 KG/M2

## 2019-04-28 DIAGNOSIS — K52.9 COLITIS: Primary | ICD-10-CM

## 2019-04-28 LAB
ALBUMIN SERPL-MCNC: 3 G/DL (ref 3.5–5)
ALBUMIN/GLOB SERPL: 0.7 {RATIO} (ref 1.1–2.2)
ALP SERPL-CCNC: 46 U/L (ref 45–117)
ALT SERPL-CCNC: 16 U/L (ref 12–78)
ANION GAP SERPL CALC-SCNC: 4 MMOL/L (ref 5–15)
AST SERPL-CCNC: 17 U/L (ref 15–37)
BASOPHILS # BLD: 0 K/UL (ref 0–0.1)
BASOPHILS NFR BLD: 0 % (ref 0–1)
BILIRUB SERPL-MCNC: 0.2 MG/DL (ref 0.2–1)
BUN SERPL-MCNC: 13 MG/DL (ref 6–20)
BUN/CREAT SERPL: 16 (ref 12–20)
CALCIUM SERPL-MCNC: 8.6 MG/DL (ref 8.5–10.1)
CHLORIDE SERPL-SCNC: 104 MMOL/L (ref 97–108)
CO2 SERPL-SCNC: 30 MMOL/L (ref 21–32)
CREAT SERPL-MCNC: 0.79 MG/DL (ref 0.55–1.02)
DIFFERENTIAL METHOD BLD: ABNORMAL
EOSINOPHIL # BLD: 0 K/UL (ref 0–0.4)
EOSINOPHIL NFR BLD: 0 % (ref 0–7)
ERYTHROCYTE [DISTWIDTH] IN BLOOD BY AUTOMATED COUNT: 13.3 % (ref 11.5–14.5)
GLOBULIN SER CALC-MCNC: 4.6 G/DL (ref 2–4)
GLUCOSE SERPL-MCNC: 135 MG/DL (ref 65–100)
HCT VFR BLD AUTO: 39.9 % (ref 35–47)
HEMOCCULT STL QL: POSITIVE
HGB BLD-MCNC: 13.7 G/DL (ref 11.5–16)
IMM GRANULOCYTES # BLD AUTO: 0 K/UL (ref 0–0.04)
IMM GRANULOCYTES NFR BLD AUTO: 0 % (ref 0–0.5)
LYMPHOCYTES # BLD: 2.1 K/UL (ref 0.8–3.5)
LYMPHOCYTES NFR BLD: 24 % (ref 12–49)
MAGNESIUM SERPL-MCNC: 2.1 MG/DL (ref 1.6–2.4)
MCH RBC QN AUTO: 34.3 PG (ref 26–34)
MCHC RBC AUTO-ENTMCNC: 34.3 G/DL (ref 30–36.5)
MCV RBC AUTO: 100 FL (ref 80–99)
MONOCYTES # BLD: 0.5 K/UL (ref 0–1)
MONOCYTES NFR BLD: 5 % (ref 5–13)
NEUTS SEG # BLD: 6.3 K/UL (ref 1.8–8)
NEUTS SEG NFR BLD: 71 % (ref 32–75)
NRBC # BLD: 0 K/UL (ref 0–0.01)
NRBC BLD-RTO: 0 PER 100 WBC
PLATELET # BLD AUTO: 226 K/UL (ref 150–400)
PMV BLD AUTO: 11 FL (ref 8.9–12.9)
POTASSIUM SERPL-SCNC: 3.6 MMOL/L (ref 3.5–5.1)
PROT SERPL-MCNC: 7.6 G/DL (ref 6.4–8.2)
RBC # BLD AUTO: 3.99 M/UL (ref 3.8–5.2)
SODIUM SERPL-SCNC: 138 MMOL/L (ref 136–145)
WBC # BLD AUTO: 9 K/UL (ref 3.6–11)

## 2019-04-28 PROCEDURE — 74011250636 HC RX REV CODE- 250/636: Performed by: EMERGENCY MEDICINE

## 2019-04-28 PROCEDURE — 83735 ASSAY OF MAGNESIUM: CPT

## 2019-04-28 PROCEDURE — 87449 NOS EACH ORGANISM AG IA: CPT

## 2019-04-28 PROCEDURE — 80053 COMPREHEN METABOLIC PANEL: CPT

## 2019-04-28 PROCEDURE — 36415 COLL VENOUS BLD VENIPUNCTURE: CPT

## 2019-04-28 PROCEDURE — 87177 OVA AND PARASITES SMEARS: CPT

## 2019-04-28 PROCEDURE — 96361 HYDRATE IV INFUSION ADD-ON: CPT

## 2019-04-28 PROCEDURE — 82272 OCCULT BLD FECES 1-3 TESTS: CPT

## 2019-04-28 PROCEDURE — 85025 COMPLETE CBC W/AUTO DIFF WBC: CPT

## 2019-04-28 PROCEDURE — 96360 HYDRATION IV INFUSION INIT: CPT

## 2019-04-28 PROCEDURE — 99284 EMERGENCY DEPT VISIT MOD MDM: CPT

## 2019-04-28 PROCEDURE — 87045 FECES CULTURE AEROBIC BACT: CPT

## 2019-04-28 RX ADMIN — SODIUM CHLORIDE 1000 ML: 900 INJECTION, SOLUTION INTRAVENOUS at 17:06

## 2019-04-28 NOTE — ED TRIAGE NOTES
Pt ambulatory from triage with family to room 32 for cc diarrhea. Per pt she has been having abd pain and diarrhea for approx 2 months. Family reports pt has been seen here and by GI and but is still having pain. Per pt the pain is sharp,stabbing, in her lower abd. Pt states she has approx 3-4 episodes per day with some bright red blood present. Pt denies any N/V, fever, chills, chest pain, SOB. Per pt she has a colonoscopy scheduled for July. Pt is in no acute distress, VSS.

## 2019-04-28 NOTE — ED PROVIDER NOTES
EMERGENCY DEPARTMENT HISTORY AND PHYSICAL EXAM 
 
 
Date: 4/28/2019 Patient Name: Lacey Beyer History of Presenting Illness Chief Complaint Patient presents with  Diarrhea  
  for three weeks History Provided By: Patient HPI: Lacey Beyer, 79 y.o. female with PMHx as noted below presents the emergency department with chief complaint of diarrhea. Patient states she has been having diarrhea now for almost 3 weeks. Was seen in the emergency department earlier this month, diagnosed with colitis and discharged on Cipro and Flagyl. Patient notes that the symptoms seem to have improved and followed up with gastroenterology as instructed however the diarrhea returned 2 days ago and today noticed a small amount of blood mixed in with the stool so is presenting again to the emergency department for further evaluation. Otherwise has been having no abdominal pain, chest pain, shortness of breath, syncope, change in urine output. The patient has had no nausea or vomiting and has been tolerating p.o. PCP: Lisa Barnes NP Current Outpatient Medications Medication Sig Dispense Refill  levothyroxine (SYNTHROID) 88 mcg tablet Take 88 mcg by mouth Daily (before breakfast).  donepezil (ARICEPT) 5 mg tablet Take 5 mg by mouth nightly.  metroNIDAZOLE (FLAGYL) 500 mg tablet Take 1 Tab by mouth two (2) times a day. 14 Tab 0  cholecalciferol, vitamin D3, (VITAMIN D3 PO) Take  by mouth.  OTHER \"blue memory pill\"  divalproex DR (DEPAKOTE) 500 mg tablet Take 500 mg by mouth two (2) times a day.  clonazePAM (KLONOPIN) 0.5 mg tablet Take 0.5 mg by mouth two (2) times daily as needed. Past History Past Medical History: 
Past Medical History:  
Diagnosis Date  Bipolar affective ([facility] [address]  Psychiatric disorder   
 bipolar Past Surgical History: 
Past Surgical History:  
Procedure Laterality Date  HX CHOLECYSTECTOMY  2015  HX TONSILLECTOMY Family History: No family history on file. Social History: 
Social History Tobacco Use  Smoking status: Current Every Day Smoker Packs/day: 1.00 Years: 50.00 Pack years: 50.00  Smokeless tobacco: Never Used Substance Use Topics  Alcohol use: No  
 Drug use: No  
 
 
Allergies: 
No Known Allergies Review of Systems Review of Systems Constitutional: Negative for fever, chills, and fatigue. HENT: Negative for congestion, sore throat, rhinorrhea, sneezing and neck stiffness Eyes: Negative for discharge and redness. Respiratory: Negative for  shortness of breath, wheezing Cardiovascular: Negative for chest pain, palpitations Gastrointestinal: Negative for nausea, vomiting, abdominal pain, constipation. Positive for  diarrhea and blood in stool. Genitourinary: Negative for dysuria, hematuria, flank pain, decreased urine volume, discharge, Musculoskeletal: Negative for myalgias or joint pain . Skin: Negative for rash or lesions . Neurological: Negative weakness, light-headedness, numbness and headaches. Physical Exam  
Physical Exam 
 
GENERAL: alert and oriented, no acute distress EYES: PEERL, No injection, discharge or icterus. ENT: Mucous membranes pink and moist. 
NECK: Supple LUNGS: Airway patent. Non-labored respirations. Breath sounds clear with good air entry bilaterally. HEART: Regular rate and rhythm. No peripheral edema ABDOMEN: Non-distended and non-tender, without guarding or rebound. SKIN:  warm, dry MSK/EXTREMITIES: Without swelling, tenderness or deformity, symmetric with normal ROM 
NEUROLOGICAL: Alert, oriented Diagnostic Study Results Labs - No results found for this or any previous visit (from the past 12 hour(s)). Radiologic Studies - No orders to display CT Results  (Last 48 hours) None CXR Results  (Last 48 hours) None Medical Decision Making I am the first provider for this patient. I reviewed the vital signs, available nursing notes, past medical history, past surgical history, family history and social history. Vital Signs-Reviewed the patient's vital signs. Records Reviewed: Nursing Notes and Old Medical Records Provider Notes (Medical Decision Making): On presentation, the patient is well appearing, in no acute distress with normal vital signs. Patient presents to the emergency room for diarrhea. Differential diagnosis considered 
includes gastroenteritis, gastritis, enteritis, inflammatory bowel disease, bowel 
obstruction, pancreatitis, cholecystitis, appendicitis, c.diff colitis, 
diverticulitis or food-borne illness. All labs and diagnostic studies as listed above were reviewed. . Abdominal examination did not demonstrate any acute surgical issues. Labs anddiagnostics were reviewed and within acceptable limits and patient did not appear significantly dehydrated/was able to tolerate p.o. without issue. Patient has had diarrhea now for approximately 3 weeks and did note to have some blood mixed with the stool yesterday although patient's hemoglobin is reassuring. Patient's history and course, concerning for possible C. difficile infection now with improvement after antibiotics and now recurrence of her symptoms. I discussed the case with gastroenterology and they recommended that if patient is stable could be discharged with follow-up as an outpatient and should hold off on empiric treatment given the reassuring work-up pending C. difficile testing. I had a conversation with the patient and her family about the options of inpatient admission for GI consultation versus outpatient management and patient is electing to be discharged. Patient is encouraged to maintain hydration to replete any fluid losses, and should return to the ERfor severe pain, intractable vomiting, fevers, or dehydration.  The patientunderstood the diagnosis and treatment and had no further questions. Patient should ramos primary care physician for follow-up, and was otherwise deemed stable for discharge to Home. ED Course:  
Initial assessment performed. The patients presenting problems have been discussed, and they are in agreement with the care plan formulated and outlined with them. I have encouraged them to ask questions as they arise throughout their visit. CONSULT: Spoke with Dr. Crissy Mckeon of gastroenterology. She recommended that the patient was stable for discharge she should call her gastroenterologist first thing in the morning. She recommended not treating empirically and waiting for C. difficile testing to return. PROGRESS NOTE: The patient has been re-evaluated and is ready for discharge. Reviewed available results with patient and have counseled them on diagnosis and care plan. They have expressed understanding, and all their questions have been answered. They agree with plan and agree to have pt F/U as recommended, or return to the ED if their sxs worsen. Discharge instructions have been provided and explained to them, along with reasons to have pt return to the ED. The patient is amenable to discharge so will discharge pt at this time Clayton Thibodeaux MD 
 
 
 
Disposition: 
home PLAN: 
1. Discharge Medication List as of 4/28/2019  8:04 PM  
  
 
2. Follow-up Information Follow up With Specialties Details Why Contact Info [facility] EMERGENCY DEPT Emergency Medicine  If symptoms worsen [address] [facility] 
[phone] [facility]  Call in 1 day  [address]   [phone] Return to ED if worse Diagnosis Clinical Impression: 1. Colitis

## 2019-04-29 LAB
C DIFF GDH STL QL: NEGATIVE
C DIFF TOX A+B STL QL IA: NEGATIVE
INTERPRETATION: NORMAL

## 2019-04-29 NOTE — DISCHARGE INSTRUCTIONS
Patient Education     Colitis: Care Instructions  Your Care Instructions  Colitis is the medical term for swelling (inflammation) of the intestine. It can be caused by different things, such as an infection or loss of blood flow in the intestine. Other causes are problems like Crohn's disease or ulcerative colitis. Symptoms may include fever, diarrhea that may be bloody, or belly pain. Sometimes symptoms go away without treatment. But you may need treatment or more tests, such as blood tests or a stool test. Or you may need imaging tests like a CT scan or a colonoscopy. In some cases, the doctor may want to test a sample of tissue from the intestine. This test is called a biopsy. The doctor has checked you carefully, but problems can develop later. If you notice any problems or new symptoms, get medical treatment right away. Follow-up care is a key part of your treatment and safety. Be sure to make and go to all appointments, and call your doctor if you are having problems. It's also a good idea to know your test results and keep a list of the medicines you take. How can you care for yourself at home? · Rest until you feel better. · Your doctor may recommend that you eat bland foods. These include rice, dry toast or crackers, bananas, and applesauce. · To prevent dehydration, drink plenty of fluids. Choose water and other caffeine-free clear liquids until you feel better. If you have kidney, heart, or liver disease and have to limit fluids, talk with your doctor before you increase the amount of fluids you drink. · Be safe with medicines. Take your medicines exactly as prescribed. Call your doctor if you think you are having a problem with your medicine. You will get more details on the specific medicines your doctor prescribes. When should you call for help? Call 911 anytime you think you may need emergency care. For example, call if:  · You passed out (lost consciousness).   · You vomit blood or what looks like coffee grounds. · Your stools are maroon or very bloody. Call your doctor now or seek immediate medical care if:  · You have new or worse pain. · You have a new or higher fever. · You have new or worse symptoms. · You cannot keep fluids or medicines down. Watch closely for changes in your health, and be sure to contact your doctor if:  · You do not get better as expected. Where can you learn more? Go to [employer]  Enter [id number] in the search box to learn more about \"Colitis: Care Instructions. \"   © 1967-7270 [employer]. Care instructions adapted under license by [facility] [employer] (which disclaims liability or warranty for this information). This care instruction is for use with your licensed healthcare professional. If you have questions about a medical condition or this instruction, always ask your healthcare professional. [address] any warranty or liability for your use of this information.   Content Version: 20.2.458282; Current as of: November 20, 2015

## 2019-04-30 LAB
BACTERIA SPEC CULT: NORMAL
C JEJUNI+C COLI AG STL QL: NEGATIVE
E COLI SXT1+2 STL IA: NEGATIVE
SERVICE CMNT-IMP: NORMAL

## 2019-05-03 LAB
O+P SPEC MICRO: NORMAL
O+P STL CONC: NORMAL
SPECIMEN SOURCE: NORMAL